# Patient Record
Sex: FEMALE | Race: BLACK OR AFRICAN AMERICAN | Employment: FULL TIME | URBAN - METROPOLITAN AREA
[De-identification: names, ages, dates, MRNs, and addresses within clinical notes are randomized per-mention and may not be internally consistent; named-entity substitution may affect disease eponyms.]

---

## 2017-05-01 ENCOUNTER — OFFICE VISIT (OUTPATIENT)
Dept: OBGYN CLINIC | Age: 27
End: 2017-05-01

## 2017-05-01 VITALS
BODY MASS INDEX: 27.11 KG/M2 | DIASTOLIC BLOOD PRESSURE: 77 MMHG | SYSTOLIC BLOOD PRESSURE: 116 MMHG | HEIGHT: 63 IN | WEIGHT: 153 LBS | HEART RATE: 80 BPM

## 2017-05-01 DIAGNOSIS — Z30.42 FAMILY PLANNING, DEPO-PROVERA CONTRACEPTION MONITORING/ADMINISTRATION: ICD-10-CM

## 2017-05-01 DIAGNOSIS — N92.0 MENORRHAGIA WITH REGULAR CYCLE: Primary | ICD-10-CM

## 2017-05-01 DIAGNOSIS — Z32.02 URINE PREGNANCY TEST NEGATIVE: ICD-10-CM

## 2017-05-01 RX ORDER — MEDROXYPROGESTERONE ACETATE 150 MG/ML
150 INJECTION, SUSPENSION INTRAMUSCULAR ONCE
Qty: 1 SYRINGE | Refills: 4
Start: 2017-05-01 | End: 2017-05-01

## 2017-05-01 NOTE — PROGRESS NOTES
Subjective:      Ata Nieto is here for her depoprovera injection. Patient wishes to continue depoprovera treatment for contraception. Side effects of treatment to date: none. Standing order is on patient's medication list.    Last Depoprovera injection date: 12/09/16  Last Pap smear date:not recorder     Objective:     Visit Vitals    /77    Pulse 80    Ht 5' 3\" (1.6 m)    Wt 153 lb (69.4 kg)    LMP 04/24/2017 (Exact Date)    BMI 27.1 kg/m2       Assessment/Plan:     Stable, doing well on Depoprovera, appropriate to continue. Depoprovera 150 mg IM given. placed in the left Gluteus ,lot# [id number] expires 11/01/2019 NDC # [address]-7    She tolerated the injection well, see Immunization activity for details. Lenora Mijares LPN     current treatment plan is effective, no change in therapy.

## 2017-05-01 NOTE — PROGRESS NOTES
[facility]  [id number] [place] [address], [facility]   [phone]      GYNECOLOGY     Subjective:      Chief complaint:    Chief Complaint   Patient presents with   Luda Clemens is a 31 y.o. female presents today to resume DMPA. Patient missed her scheduled DMPA dose in March. States she's been abstinent since. Denies adverse side effect due to DMPA. Satisfied with effect it has on heavy menses. Review of Systems:  General ROS: negative for - fever, chills   Gastrointestinal ROS:negative for - abdominal pain, blood in stools, change in bowel habits, constipation, diarrhea, hematemesis or nausea/vomiting  Genito-Urinary ROS: negative for - dysmenorrhea, dyspareunia, dysuria, genital discharge, genital ulcers, hematuria, incontinence, pelvic pain or urinary frequency/urgency    OB History      Para Term  AB TAB SAB Ectopic Multiple Living    2 2 2                 Gynecology:  Regular menses since menarche. Hx of heavy flow without severe cramping. The current method of family planning is abstinence. History reviewed. No pertinent past medical history. No hx CAD, liver disease, migraine with aura, VTE  Past Surgical History:   Procedure Laterality Date    HX  SECTION      HX  SECTION  2012     Social History     Social History    Marital status: SINGLE     Spouse name: N/A    Number of children: N/A    Years of education: N/A     Occupational History    Not on file. Social History Main Topics    Smoking status: Former Smoker    Smokeless tobacco: Never Used    Alcohol use Yes    Drug use: No    Sexual activity: Yes     Partners: Male     Other Topics Concern    Not on file     Social History Narrative     History reviewed. No pertinent family history. No Known Allergies  No current outpatient prescriptions on file prior to visit. No current facility-administered medications on file prior to visit. Objective:     Visit Vitals    /77    Pulse 80    Ht 5' 3\" (1.6 m)    Wt 153 lb (69.4 kg)    LMP 04/24/2017 (Exact Date)    BMI 27.1 kg/m2       PE deferred. Assessment/Plan:   >50% of 20 minute visit spent counseling on     ICD-10-CM ICD-9-CM    1. Menorrhagia with regular cycle N92.0 626.2 AMB POC URINE PREGNANCY TEST, VISUAL COLOR COMPARISON      OK MEDROXYPROGESTERONE ACETATE, 1MG      OK THER/PROPH/DIAG INJECTION, SUBCUT/IM      medroxyPROGESTERone (DEPO-PROVERA) 150 mg/mL syrg   2. Family planning, Depo-Provera contraception monitoring/administration Z30.42 V25.49 AMB POC URINE PREGNANCY TEST, VISUAL COLOR COMPARISON      OK MEDROXYPROGESTERONE ACETATE, 1MG      OK THER/PROPH/DIAG INJECTION, SUBCUT/IM      medroxyPROGESTERone (DEPO-PROVERA) 150 mg/mL syrg     Multiple hormonal contraception options reviewed along with individual R/B/A. Patient happy with DMPA and opts to continue. Proper use, common side effects and risks reviewed. Vitamin D and calcium supplementation encouraged. Routine pap smear screening guidelines reviewed. Return 5/2018 for WWE/pap smear. Plan of care discussed. Patient expressed understanding and agreement.

## 2017-05-01 NOTE — PATIENT INSTRUCTIONS
Shot for [employer] Control: Care Instructions  Your Care Instructions  The shot is used to prevent pregnancy. You get the shot in your upper arm or rear end (buttocks). The shot gives you a dose of the hormone progestin. The shot is often called by its brand name, Depo-Provera. The shot provides birth control for 3 months at a time. You then need another shot. Follow-up care is a key part of your treatment and safety. Be sure to make and go to all appointments, and call your doctor if you are having problems. It's also a good idea to know your test results and keep a list of the medicines you take. How can you care for yourself at home? How do you use the birth control shot? · If you get the shot during the first 5 days of your normal period, use backup birth control, such as a condom, or don't have intercourse for 24 hours. · If you get the shot more than 5 days after your periods starts, use backup birth control or don't have intercourse for 5 days. · Talk to your doctor about a schedule to get the shot. You need the shot every 3 months. If you are late getting it, you'll need backup birth control. What if you miss or are late for a shot? Always read the label for specific instructions, or call your doctor. Here are some basic guidelines:  · Use backup birth control, such as a condom, or don't have intercourse. Continue using one of these methods until 5 days after you get the missed or late shot. · If you had intercourse, you can use emergency contraception, such as the morning-after pill (Plan B). You can use emergency contraception for up to 5 days after having had sex, but it works best if you take it right away. What else do you need to know? · The shot has side effects. Because the shot protects for 3 months, the side effects may last 3 months. ¨ You may have changes in your period and your period may stop. You may also have spotting or bleeding between periods.   ¨ You may have mood changes, less interest in sex, or weight gain. · The shot may cause bone loss. Talk to your doctor about this and take steps to prevent bone loss, such as getting enough calcium in your diet and exercising regularly. · Check with your doctor before you use any other medicines, including over-the-counter medicines, vitamins, herbal products, and supplements. Birth control hormones may not work as well to prevent pregnancy when combined with other medicines. · The shot doesn't protect against sexually transmitted infections (STIs), such as herpes or HIV/AIDS. If you're not sure whether your sex partner might have an STI, use a condom to protect against infection. When should you call for help? Call your doctor now or seek immediate medical care if:  · You have severe belly pain. Watch closely for changes in your health, and be sure to contact your doctor if:  · You think you may be pregnant. · You have any problems with your birth control. · You feel you may be depressed. · You regularly have spotting. · You think you may have been exposed to or have a sexually transmitted infection. Where can you learn more? Go to http://devon-marianna.info/. Enter X688 in the search box to learn more about \"Shot for Birth Control: Care Instructions. \"  Current as of: May 30, 2016  Content Version: 11.2  © 4817-0020 [employer]. Care instructions adapted under license by [employer] (which disclaims liability or warranty for this information). If you have questions about a medical condition or this instruction, always ask your healthcare professional. [place] [address] any warranty or liability for your use of this information.

## 2017-08-17 ENCOUNTER — OFFICE VISIT (OUTPATIENT)
Dept: OBGYN CLINIC | Age: 27
End: 2017-08-17

## 2017-08-17 DIAGNOSIS — Z30.42 FAMILY PLANNING, DEPO-PROVERA CONTRACEPTION MONITORING/ADMINISTRATION: Primary | ICD-10-CM

## 2017-08-17 LAB
HCG URINE, QL. (POC): NEGATIVE
VALID INTERNAL CONTROL?: YES

## 2017-08-17 NOTE — PROGRESS NOTES
Depo injection given to this patient  In the left gluteus  pregnancy test negative .  LOT [id number] expires 02/30/20

## 2017-11-07 ENCOUNTER — CLINICAL SUPPORT (OUTPATIENT)
Dept: OBGYN CLINIC | Age: 27
End: 2017-11-07

## 2017-11-07 DIAGNOSIS — Z30.09 FAMILY PLANNING EDUCATION, GUIDANCE, AND COUNSELING: ICD-10-CM

## 2017-11-07 DIAGNOSIS — Z30.42 FAMILY PLANNING, DEPO-PROVERA CONTRACEPTION MONITORING/ADMINISTRATION: Primary | ICD-10-CM

## 2017-11-07 LAB
HCG URINE, QL. (POC): NEGATIVE
VALID INTERNAL CONTROL?: YES

## 2017-11-07 NOTE — PROGRESS NOTES
Negative UPT. Medroxyprogesterone (Depo Provera) 150 mg, IM injection, right gluteus. Pt tolerated injection well & voices no complaints. Next injection due Jan 23, 2018 - Feb. 6, 2018 at annual appt.   Zeina, [address] [id number], EXP 02-, Jack Luo LPN

## 2017-11-10 RX ORDER — MEDROXYPROGESTERONE ACETATE 150 MG/ML
INJECTION, SUSPENSION INTRAMUSCULAR
Qty: 1 ML | Refills: 0 | Status: SHIPPED | OUTPATIENT
Start: 2017-11-10 | End: 2018-04-23 | Stop reason: SDUPTHER

## 2018-04-23 ENCOUNTER — OFFICE VISIT (OUTPATIENT)
Dept: OBGYN CLINIC | Age: 28
End: 2018-04-23

## 2018-04-23 VITALS
DIASTOLIC BLOOD PRESSURE: 76 MMHG | WEIGHT: 157 LBS | BODY MASS INDEX: 27.82 KG/M2 | HEIGHT: 63 IN | HEART RATE: 79 BPM | SYSTOLIC BLOOD PRESSURE: 115 MMHG

## 2018-04-23 DIAGNOSIS — Z01.419 WELL WOMAN EXAM WITH ROUTINE GYNECOLOGICAL EXAM: ICD-10-CM

## 2018-04-23 DIAGNOSIS — N63.15 BREAST LUMP ON RIGHT SIDE AT 9 O'CLOCK POSITION: ICD-10-CM

## 2018-04-23 DIAGNOSIS — Z30.09 FAMILY PLANNING EDUCATION, GUIDANCE, AND COUNSELING: Primary | ICD-10-CM

## 2018-04-23 LAB
HCG URINE, QL. (POC): NEGATIVE
VALID INTERNAL CONTROL?: YES

## 2018-04-23 RX ORDER — MEDROXYPROGESTERONE ACETATE 150 MG/ML
INJECTION, SUSPENSION INTRAMUSCULAR
Qty: 1 ML | Refills: 4 | Status: SHIPPED | OUTPATIENT
Start: 2018-04-23 | End: 2019-07-31 | Stop reason: SDUPTHER

## 2018-04-23 NOTE — PROGRESS NOTES
Subjective:   28 y.o. female for annual routine Pap and checkup. No LMP recorded. Last pap smear:  5 years ago  Menstrual history:was on Depo supposed to be due 2018  Dysmenorrhea no  H/o STIs:  CT at age 22  Social History: single partner, contraception - condoms. [unfilled]  OB History    Para Term  AB Living   2 2 2   2   SAB TAB Ectopic Molar Multiple Live Births              # Outcome Date GA Lbr Chinedu/2nd Weight Sex Delivery Anes PTL Lv   2 Term            1 Term                   Pertinent past medical hstory: no history of HTN, DVT, CAD, DM, liver disease, migraines or smoking. There is no problem list on file for this patient. There are no active problems to display for this patient. Current Outpatient Prescriptions   Medication Sig Dispense Refill    medroxyPROGESTERone (DEPO-PROVERA) 150 mg/mL injection INJECT 1 ML INTRAMUSCULARLY ONCE FOR 1 DOSE. RETURN TO OFFICE. 1 mL 4     No Known Allergies  History reviewed. No pertinent past medical history. Past Surgical History:   Procedure Laterality Date    HX  SECTION      HX  SECTION       History reviewed. No pertinent family history. Social History   Substance Use Topics    Smoking status: Former Smoker    Smokeless tobacco: Never Used    Alcohol use Yes        ROS:  Feeling well. No dyspnea or chest pain on exertion. No abdominal pain, change in bowel habits, black or bloody stools. No urinary tract symptoms. GYN ROS: normal menses, no pelvic pain or discharge, no breast pain or new or enlarging lumps on self exam. No neurological complaints. Objective:     Visit Vitals    /76    Pulse 79    Ht 5' 3\" (1.6 m)    Wt 157 lb (71.2 kg)    BMI 27.81 kg/m2     The patient appears well, alert, oriented x 3, in no distress. ENT normal.  Neck supple. No adenopathy or thyromegaly. JOSUÉ. Lungs are clear, good air entry, no wheezes, rhonchi or rales.  S1 and S2 normal, no murmurs, regular rate and rhythm. Abdomen soft without tenderness, guarding, mass or organomegaly. Extremities show no edema, normal peripheral pulses. Neurological is normal, no focal findings. BREAST EXAM:   right breast with mobile lump, 4 cm, 9:00, no skin or nipple changes or axillary nodes. left breast normal without mass, skin or nipple changes or axillary nodes    PELVIC EXAM: VULVA: normal appearing vulva with no masses, tenderness or lesions, VAGINA: normal appearing vagina with normal color and discharge, no lesions, CERVIX: normal appearing cervix without discharge or lesions, UTERUS: uterus is normal size, shape, consistency and nontender, ADNEXA: normal adnexa in size, nontender and no masses, PAP: Pap smear done today, DNA probe for chlamydia and GC obtained    Assessment/Plan:   well woman  pap smear  counseled on breast self exam, STD prevention and family planning choices  return annually or prn    ICD-10-CM ICD-9-CM    1. Family planning education, guidance, and counseling Z30.09 V25.09 RI MEDROXYPROGESTERONE ACETATE, 1MG      RI THER/PROPH/DIAG INJECTION, SUBCUT/IM      AMB POC URINE PREGNANCY TEST, VISUAL COLOR COMPARISON      medroxyPROGESTERone (DEPO-PROVERA) 150 mg/mL injection   2. Well woman exam with routine gynecological exam Z01.419 V72.31 PAP IG, CT-NG, RFX HPV VKOP([id number], [id number])   3. Breast lump on right side at 9 o'clock position N63.10 611.72 US BREAST RT LIMITED=<3 QUAD   . Discussed with patient that our office will call for abnormal lab results. Normal results can be reviewed through [employer]. If patient has not received a phone call from our office or there are no results found on [employer], the patient has been instructed to call our office to follow up on results. I have verbalized the plan of care with patient and the patient expressed understanding.    All questions were answered

## 2018-04-23 NOTE — LETTER
4/25/2018 1:42 PM 
 
Ms. Lyle Braun [address] Dear Lyle Braun I have reviewed your results and have found the results listed below to be within normal ranges. Pap Smear : Normal 
 
My recommendations are as follows: Please schedule your next Pap Smear in 3 years. Please call if you have any questions [phone] . Sincerely, Danya Arzola, DO

## 2018-04-23 NOTE — PATIENT INSTRUCTIONS

## 2018-04-23 NOTE — PROGRESS NOTES
Negative UPT. Medroxyprogesterone (Depo Provera) 150 mg, IM injection, left gluteus. Pt tolerated injection well & voices no complaints. Next injection due July 9, 2018 - July 23, 2018.   Zeina, [address], [address] EXP 07- Yadira Quigley LPN    [facility] 96-

## 2018-04-24 LAB
CHLAMYDIA TRACHOMATIS THINPREP, 13342: NEGATIVE
NEISSERIA GONORRHOEAE THINPREP, 13343: NEGATIVE
PAP IMAGE GUIDED, 8900296: NORMAL

## 2018-04-25 DIAGNOSIS — N76.0 BV (BACTERIAL VAGINOSIS): Primary | ICD-10-CM

## 2018-04-25 DIAGNOSIS — B96.89 BV (BACTERIAL VAGINOSIS): Primary | ICD-10-CM

## 2018-04-25 RX ORDER — METRONIDAZOLE 500 MG/1
500 TABLET ORAL 2 TIMES DAILY
Qty: 14 TAB | Refills: 0 | Status: SHIPPED | OUTPATIENT
Start: 2018-04-25 | End: 2018-05-02

## 2018-04-25 NOTE — PROGRESS NOTES
Pap NILM. Repeat pap in 3 years or as clinically indicated. TAO Mijares to send letter to patient with above recommendation. BV noted. Rx sent to patient's preferred pharmacy on file. LPN to call patient to notify her of results and that she may  her medication and take as prescribed. Patient to call if any further questions.

## 2018-04-26 NOTE — PROGRESS NOTES
Patient notified and voices understanding:  Normal pap, repeat in 3 years. Annual exam in 1 yr.   positive for BV, this is NOT a std, Rx was sent to pharm on file.

## 2018-04-30 ENCOUNTER — HOSPITAL ENCOUNTER (OUTPATIENT)
Dept: ULTRASOUND IMAGING | Age: 28
Discharge: HOME | End: 2018-04-30
Attending: OBSTETRICS & GYNECOLOGY
Payer: COMMERCIAL

## 2018-04-30 DIAGNOSIS — N63.15 BREAST LUMP ON RIGHT SIDE AT 9 O'CLOCK POSITION: ICD-10-CM

## 2018-04-30 PROCEDURE — 76642 ULTRASOUND BREAST LIMITED: CPT

## 2018-07-09 ENCOUNTER — OFFICE VISIT (OUTPATIENT)
Dept: OBGYN CLINIC | Age: 28
End: 2018-07-09

## 2018-07-09 DIAGNOSIS — Z30.09 FAMILY PLANNING: Primary | ICD-10-CM

## 2018-07-09 LAB
HCG URINE, QL. (POC): NEGATIVE
VALID INTERNAL CONTROL?: YES

## 2018-07-09 NOTE — PROGRESS NOTES
Subjective:      Sophia Client is here for her depoprovera injection. Patient wishes to continue depoprovera treatment for contraception. Side effects of treatment to date: none. Standing order is on patient's medication list.    Last Depoprovera injection date:4/23/18  Last Pap smear date: 4/23/18    Objective: There were no vitals taken for this visit. Assessment/Plan:     Stable, doing well on Depoprovera, appropriate to continue. Depoprovera 150 mg IM given. She tolerated the injection well, see Immunization activity for details. Lenora Mijares LPN     current treatment plan is effective, no change in therapy.

## 2018-07-26 ENCOUNTER — TELEPHONE (OUTPATIENT)
Dept: OBGYN CLINIC | Age: 28
End: 2018-07-26

## 2018-10-08 ENCOUNTER — CLINICAL SUPPORT (OUTPATIENT)
Dept: OBGYN CLINIC | Age: 28
End: 2018-10-08

## 2018-10-08 DIAGNOSIS — Z30.09 FAMILY PLANNING: Primary | ICD-10-CM

## 2018-10-08 NOTE — PROGRESS NOTES
Date last pap: 4/23/18. Last Depo-Provera: 7/9/18. Side Effects if any: NONE . Serum HCG indicated? NO. Depo-Provera 150 mg IM given by: ELI BURGER.   Next appointment due 12/21/18

## 2018-10-09 LAB
HCG URINE, QL. (POC): NEGATIVE
VALID INTERNAL CONTROL?: YES

## 2019-01-10 ENCOUNTER — CLINICAL SUPPORT (OUTPATIENT)
Dept: OBGYN CLINIC | Age: 29
End: 2019-01-10

## 2019-01-10 VITALS — BODY MASS INDEX: 28.17 KG/M2 | WEIGHT: 159 LBS | HEIGHT: 63 IN

## 2019-01-10 DIAGNOSIS — Z30.09 FAMILY PLANNING: Primary | ICD-10-CM

## 2019-01-10 NOTE — PROGRESS NOTES
Subjective:      Eddie Santana is here for her depoprovera injection. Patient wishes to continue depoprovera treatment for contraception. Side effects of treatment to date: none. Standing order is on patient's medication list.    Last Depoprovera injection date:10/08/18  Last Pap smear date:04/23/18    Objective:     Visit Vitals  Ht 5' 3\" (1.6 m)   Wt 159 lb (72.1 kg)   BMI 28.17 kg/m²       Assessment/Plan:     Stable, doing well on Depoprovera, appropriate to continue. Depoprovera 150 mg IM given. She tolerated the injection well, see Immunization activity for details.     Cristina Patel LPN

## 2019-01-14 LAB
HCG URINE, QL. (POC): NEGATIVE
VALID INTERNAL CONTROL?: YES

## 2019-07-31 ENCOUNTER — OFFICE VISIT (OUTPATIENT)
Dept: OBGYN CLINIC | Age: 29
End: 2019-07-31

## 2019-07-31 VITALS
WEIGHT: 165 LBS | DIASTOLIC BLOOD PRESSURE: 75 MMHG | HEIGHT: 63 IN | BODY MASS INDEX: 29.23 KG/M2 | RESPIRATION RATE: 18 BRPM | SYSTOLIC BLOOD PRESSURE: 118 MMHG | HEART RATE: 78 BPM

## 2019-07-31 DIAGNOSIS — Z30.09 FAMILY PLANNING EDUCATION, GUIDANCE, AND COUNSELING: ICD-10-CM

## 2019-07-31 LAB
HCG URINE, QL. (POC): NEGATIVE
VALID INTERNAL CONTROL?: YES

## 2019-07-31 RX ORDER — MEDROXYPROGESTERONE ACETATE 150 MG/ML
INJECTION, SUSPENSION INTRAMUSCULAR
Qty: 1 ML | Refills: 4 | Status: SHIPPED | OUTPATIENT
Start: 2019-07-31

## 2019-07-31 NOTE — PROGRESS NOTES
Date last pap:04/23/18  Last Depo-Provera: .n/a  Side Effects if any:none  Serum HCG indicated?  UPT NEGATIVE  Depo-Provera 150 mg IM given by: [facility] TAO  Next appointment due 10/19

## 2019-07-31 NOTE — PATIENT INSTRUCTIONS
Learning About Birth Control: The Shot  What is the shot? The shot is used to prevent pregnancy. You get the shot in your upper arm or rear end (buttocks). The shot gives you a dose of the hormone progestin. The shot is often called by its brand name, Depo-Provera. Progestin prevents pregnancy in these ways: It thickens the mucus in the cervix. This makes it hard for sperm to travel into the uterus. It also thins the lining of the uterus, which makes it harder for a fertilized egg to attach to the uterus. Progestin can sometimes stop the ovaries from releasing an egg each month (ovulation). The shot provides birth control for 3 months at a time. You then need another shot. The shot may cause bone loss. Talk to your doctor about the risks and benefits. How well does it work? In the first year of use:  · When the shot is used exactly as directed, fewer than 1 woman out of 100 has an unplanned pregnancy. · When the shot is not used exactly as directed, 6 women out of 100 have an unplanned pregnancy. Be sure to tell your doctor about any health problems you have or medicines you take. He or she can help you choose the birth control method that is right for you. What are the advantages of the shot? · The shot is one of the most effective methods of birth control. · It's convenient. You need to get a shot only once every 3 months to prevent pregnancy. You don't have to interrupt sex to protect against pregnancy. · It prevents pregnancy for 3 months at a time. You don't have to worry about birth control for this time. · It's safe to use while breastfeeding. · The shot may reduce heavy bleeding and cramping. · The shot doesn't contain estrogen. So you can use it if you don't want to take estrogen or can't take estrogen because you have certain health problems or concerns. What are the disadvantages of the shot?   · The shot doesn't protect against sexually transmitted infections (STIs), such as herpes or HIV/AIDS. If you aren't sure if your sex partner might have an STI, use a condom to protect against disease. · The shot may cause bone loss in some women. Talk to your doctor about the risks and benefits. · The shot is needed every 3 months. Any side effects may last 3 months or longer. ? The shot may cause irregular periods, or you may have spotting between periods. You may also stop getting a period. Some women see having no period as an advantage. ? It may cause mood changes, less interest in sex, or weight gain. · If you want to get pregnant, it may take up to 18 months after you stop getting the shot. This is because the hormones the shot provided have to leave your system, and your body has to readjust.  Where can you learn more? Go to http://devon-marianna.info/. Enter O768 in the search box to learn more about \"Learning About Birth Control: The Shot. \"  Current as of: September 5, 2018  Content Version: 12.1  © 7090-8404 [employer]. Care instructions adapted under license by [employer] (which disclaims liability or warranty for this information). If you have questions about a medical condition or this instruction, always ask your healthcare professional. [address] any warranty or liability for your use of this information.

## 2019-07-31 NOTE — PROGRESS NOTES
Gynecology Progress Note    7/31/2019     Patient presents for   Chief Complaint   Patient presents with   [facility]     room 12        Vitals:  Blood pressure 118/75, pulse 78, resp. rate 18, height 5' 3\" (1.6 m), weight 165 lb (74.8 kg). @DLRR[73@      Exam:  Lab/Data Review: All lab results for the last 24 hours reviewed. Assessment and Plan:  15 minutes spent with patient of which greater than 50% spent in counseling about   Chief Complaint   Patient presents with   [facility]     room 12       Would like Depo Provera for birth control- Depo ordered to pharmacy. Need to get bone density scan done- will order.     Mitesh Keita MD

## 2019-10-14 ENCOUNTER — CLINICAL SUPPORT (OUTPATIENT)
Dept: OBGYN CLINIC | Age: 29
End: 2019-10-14

## 2019-10-14 VITALS
DIASTOLIC BLOOD PRESSURE: 79 MMHG | BODY MASS INDEX: 29.23 KG/M2 | HEIGHT: 63 IN | HEART RATE: 107 BPM | SYSTOLIC BLOOD PRESSURE: 116 MMHG

## 2019-10-14 DIAGNOSIS — Z30.09 FAMILY PLANNING: Primary | ICD-10-CM

## 2019-10-14 LAB
HCG URINE, QL. (POC): NEGATIVE
VALID INTERNAL CONTROL?: YES

## 2019-10-14 NOTE — LETTER
NOTIFICATION RETURN TO WORK / SCHOOL 
 
10/14/2019 3:29 PM 
 
Ms. Adalberto Sharma [address] To Whom It May Concern: 
 
Adalberto Sharma is currently under the care of [facility]. She will return to work/school on:  10/15/19 If there are questions or concerns please have the patient contact our office. Sincerely, Faith Bee MD

## 2019-10-14 NOTE — PROGRESS NOTES
Patient  Here to receive Medroxy-progesterone 150 mg/ml    Verbal order obtained from Luis Alberto Adams  ,  Abdifatah identifiers checked  by both nurses   Visit Vitals  Ht 5' 3\" (1.6 m)   BMI 29.23 kg/m²     Urine pregnancy test negative Depo  Depo Provera  150 mg /1 mL   Was place in the right gluteus   LOT # [id number] expires 08/01/21  Patient observed for 15 minutes and discharge home in stable condition  Discharge instructions given to follow up in office in 90 days for her next injection

## 2020-02-07 ENCOUNTER — OFFICE VISIT (OUTPATIENT)
Dept: OBGYN CLINIC | Age: 30
End: 2020-02-07

## 2020-02-07 ENCOUNTER — HOSPITAL ENCOUNTER (OUTPATIENT)
Dept: LAB | Age: 30
Discharge: HOME | End: 2020-02-07
Payer: COMMERCIAL

## 2020-02-07 VITALS
DIASTOLIC BLOOD PRESSURE: 93 MMHG | HEART RATE: 144 BPM | HEIGHT: 63 IN | WEIGHT: 164 LBS | RESPIRATION RATE: 18 BRPM | SYSTOLIC BLOOD PRESSURE: 137 MMHG | BODY MASS INDEX: 29.06 KG/M2

## 2020-02-07 DIAGNOSIS — Z01.419 WELL WOMAN EXAM WITH ROUTINE GYNECOLOGICAL EXAM: ICD-10-CM

## 2020-02-07 DIAGNOSIS — Z30.09 FAMILY PLANNING: Primary | ICD-10-CM

## 2020-02-07 DIAGNOSIS — Z11.3 ROUTINE SCREENING FOR STI (SEXUALLY TRANSMITTED INFECTION): ICD-10-CM

## 2020-02-07 DIAGNOSIS — N63.12 BREAST LUMP ON RIGHT SIDE AT 2 O'CLOCK POSITION: ICD-10-CM

## 2020-02-07 DIAGNOSIS — Z30.42 ENCOUNTER FOR DEPO-PROVERA CONTRACEPTION: ICD-10-CM

## 2020-02-07 LAB
HCG URINE, QL. (POC): NEGATIVE
VALID INTERNAL CONTROL?: YES

## 2020-02-07 PROCEDURE — 87661 TRICHOMONAS VAGINALIS AMPLIF: CPT

## 2020-02-07 PROCEDURE — 88175 CYTOPATH C/V AUTO FLUID REDO: CPT

## 2020-02-07 PROCEDURE — 87624 HPV HI-RISK TYP POOLED RSLT: CPT

## 2020-02-07 RX ORDER — MEDROXYPROGESTERONE ACETATE 150 MG/ML
150 INJECTION, SUSPENSION INTRAMUSCULAR ONCE
Qty: 1 SYRINGE | Refills: 3
Start: 2020-02-07 | End: 2020-02-07

## 2020-02-07 NOTE — PROGRESS NOTES
Subjective:   26 y.o. female for Well Woman Check. She desires depo provera for contraception for which she had used previously but missed her injection window in January. No LMP recorded. Social History: single partner, contraception - Depo-Provera injections. Pertinent past medical hstory: no history of HTN, DVT, CAD, DM, liver disease, migraines or smoking. There is no problem list on file for this patient. There are no active problems to display for this patient. Current Outpatient Medications   Medication Sig Dispense Refill    medroxyPROGESTERone (DEPO-PROVERA) 150 mg/mL syrg 1 mL by IntraMUSCular route once for 1 dose. 1 Syringe 3    medroxyPROGESTERone (DEPO-PROVERA) 150 mg/mL injection INJECT 1 ML INTRAMUSCULARLY ONCE FOR 1 DOSE. RETURN TO OFFICE. 1 mL 4     No Known Allergies  History reviewed. No pertinent past medical history. Past Surgical History:   Procedure Laterality Date    HX  SECTION      HX  SECTION       History reviewed. No pertinent family history. Social History     Tobacco Use    Smoking status: Former Smoker    Smokeless tobacco: Never Used   Substance Use Topics    Alcohol use: Yes        ROS:  Feeling well. No dyspnea or chest pain on exertion. No abdominal pain, change in bowel habits, black or bloody stools. No urinary tract symptoms. GYN ROS: no breast pain or new or enlarging lumps on self exam. No neurological complaints. Objective:     Visit Vitals  BP (!) 137/93   Pulse (!) 144   Resp 18   Ht 5' 3\" (1.6 m)   Wt 164 lb (74.4 kg)   BMI 29.05 kg/m²     The patient appears well, alert, oriented x 3, in no distress. ENT normal.  Neck supple. No adenopathy or thyromegaly. JOSUÉ. Lungs are clear, good air entry, no wheezes, rhonchi or rales. S1 and S2 normal, no murmurs, regular rate and rhythm. Abdomen soft without tenderness, guarding, mass or organomegaly. Extremities show no edema, normal peripheral pulses.  Neurological is normal, no focal findings. BREAST EXAM: breasts appear normal, no suspicious masses, right breast lump noted in right inner quadrant from 2-3 o'clock; fibrocystic changes suspected; no skin or nipple changes or axillary nodes    PELVIC EXAM: normal external genitalia, vulva, vagina, cervix, uterus and adnexa    Assessment/Plan:   well woman  no contraindication to continue hormonal therapy  pap smear  counseled on breast self exam  return annually or prn  Previously diagnosed with fibrocystic breast disease; will order repeat imaging to ensure stability. ICD-10-CM ICD-9-CM    1. Family planning Z30.09 V25.09 AMB POC URINE PREGNANCY TEST, VISUAL COLOR COMPARISON      medroxyPROGESTERone (DEPO-PROVERA) 150 mg/mL syrg   2. Well woman exam with routine gynecological exam Z01.419 V72.31 PAP IG, CT-NG TV HPV 16&18,45 ([id number], [id number])      T PALLIDUM AB      HEP B SURFACE AG      HEPATITIS C AB      HIV 1/2 AG/AB, 4TH GENERATION,W RFLX CONFIRM   3. Encounter for Depo-Provera contraception Z30.42 V25.49    4. Routine screening for STI (sexually transmitted infection) Z11.3 V74.5 T PALLIDUM AB      HEP B SURFACE AG      HEPATITIS C AB      HIV 1/2 AG/AB, 4TH GENERATION,W RFLX CONFIRM   5. Breast lump on right side at 2 o'clock position N63.12 611.72 US BREAST RT LIMITED=<3 QUAD     Encounter Diagnoses   Name Primary?     Family planning Yes    Well woman exam with routine gynecological exam     Encounter for Depo-Provera contraception     Routine screening for STI (sexually transmitted infection)     Breast lump on right side at 2 o'clock position      Orders Placed This Encounter    US BREAST RT LIMITED=<3 QUAD    T PALLIDUM AB    HEP B SURFACE AG    HEPATITIS C AB    HIV 1/2 AG/AB, 4TH GENERATION,W RFLX CONFIRM    AMB POC URINE PREGNANCY TEST, VISUAL COLOR COMPARISON    medroxyPROGESTERone (DEPO-PROVERA) 150 mg/mL syrg    PAP IG, CT-NG TV HPV 16&18,45 ([id number], [id number])     Follow-up and Dispositions    · Return in about 1 year (around 2/7/2021), or if symptoms worsen or fail to improve, for Annual Exam or prn. Marcos Mayberry

## 2020-02-07 NOTE — PROGRESS NOTES
Patient  Here to receive Medroxy-progesterone 150 mg/ml    Verbal order obtained from  MD [email or web address]  Medication check witness by [facility] ,LPN,  2 identifiers checked  by both nurses   Visit Vitals  BP (!) 137/93   Pulse (!) 144   Resp 18   Ht 5' 3\" (1.6 m)   Wt 164 lb (74.4 kg)   BMI 29.05 kg/m²     Urine pregnancy test negative Depo  Depo Provera  150 mg /1 mL   Was place in the right arm   LOT # [id number] expires 12/01/21  Patient observed for 15 minutes and discharge home in stable condition  Discharge instructions given to follow up in office in 90 days for her next injection

## 2020-02-10 LAB
C TRACH RRNA SPEC QL NAA+PROBE: POSITIVE
N GONORRHOEA RRNA SPEC QL NAA+PROBE: NEGATIVE
SPECIMEN SOURCE: ABNORMAL
T VAGINALIS RRNA SPEC QL NAA+PROBE: NEGATIVE

## 2020-02-11 DIAGNOSIS — A74.9 CHLAMYDIA INFECTION: Primary | ICD-10-CM

## 2020-02-11 RX ORDER — AZITHROMYCIN 500 MG/1
1000 TABLET, FILM COATED ORAL ONCE
Qty: 2 TAB | Refills: 0 | Status: SHIPPED | OUTPATIENT
Start: 2020-02-11 | End: 2020-02-11

## 2020-02-12 ENCOUNTER — TELEPHONE (OUTPATIENT)
Dept: OBGYN CLINIC | Age: 30
End: 2020-02-12

## 2020-02-12 NOTE — TELEPHONE ENCOUNTER
----- Message from Mini Martinez MD sent at 2/11/2020 10:09 AM EST -----  +chlamydia. Rx sent to pharmacy. Please advise. Thank you.

## 2020-02-12 NOTE — TELEPHONE ENCOUNTER
Spoke with patient concerning Pap Results and abnormal culture ,patient was informed that Rx has been sent to her pharmacy. Instructed her to refrain from  intercourse for 14 days.  And to follow up in 4 weeks for test of cure

## 2020-05-12 ENCOUNTER — CLINICAL SUPPORT (OUTPATIENT)
Dept: OBGYN CLINIC | Age: 30
End: 2020-05-12

## 2020-05-12 DIAGNOSIS — Z30.09 FAMILY PLANNING: Primary | ICD-10-CM

## 2020-05-12 DIAGNOSIS — Z30.42 ENCOUNTER FOR MANAGEMENT AND INJECTION OF DEPO-PROVERA: ICD-10-CM

## 2020-05-12 LAB
HCG URINE, QL. (POC): NEGATIVE
VALID INTERNAL CONTROL?: YES

## 2020-05-12 NOTE — PROGRESS NOTES
Date last pap:02/07/2020  Last Depo-Provera:02/07/2020 . Side Effects if any: none. Serum HCG indicated? UPT NEGATIVE. [facility] Next appointment due 07/2020.

## 2020-08-03 ENCOUNTER — CLINICAL SUPPORT (OUTPATIENT)
Dept: OBGYN CLINIC | Age: 30
End: 2020-08-03

## 2020-08-03 VITALS
SYSTOLIC BLOOD PRESSURE: 122 MMHG | DIASTOLIC BLOOD PRESSURE: 84 MMHG | HEART RATE: 94 BPM | HEIGHT: 63 IN | WEIGHT: 169 LBS | BODY MASS INDEX: 29.95 KG/M2

## 2020-08-03 DIAGNOSIS — Z30.09 FAMILY PLANNING: Primary | ICD-10-CM

## 2020-08-03 LAB
HCG URINE, QL. (POC): NEGATIVE
VALID INTERNAL CONTROL?: YES

## 2020-08-03 NOTE — PROGRESS NOTES
Patient  here today to her receive Medroxy-progesterone 150 mg/ml    Verbal order obtained from Luis Alberto Adams 2 identifiers checked  by both team members  Visit Vitals  /84   Pulse 94   Ht 5' 3\" (1.6 m)   Wt 169 lb (76.7 kg)   BMI 29.94 kg/m²     Urine pregnancy test negative and charted . Depo Provera  150 mg /1 mL   Was place in the  Right Gluteus     LOT # [id number] expires 04/01/22    Patient observed for 15 minutes and discharge home in stable condition. Discharge instructions given to follow up in office in 90 days for her next injection .